# Patient Record
Sex: MALE | Race: WHITE | NOT HISPANIC OR LATINO | Employment: FULL TIME | URBAN - METROPOLITAN AREA
[De-identification: names, ages, dates, MRNs, and addresses within clinical notes are randomized per-mention and may not be internally consistent; named-entity substitution may affect disease eponyms.]

---

## 2018-03-01 ENCOUNTER — APPOINTMENT (OUTPATIENT)
Dept: GENERAL RADIOLOGY | Facility: HOSPITAL | Age: 38
End: 2018-03-01

## 2018-03-01 ENCOUNTER — APPOINTMENT (OUTPATIENT)
Dept: CT IMAGING | Facility: HOSPITAL | Age: 38
End: 2018-03-01

## 2018-03-01 ENCOUNTER — HOSPITAL ENCOUNTER (OUTPATIENT)
Dept: CARDIOLOGY | Facility: HOSPITAL | Age: 38
Discharge: HOME | End: 2018-03-01
Attending: INTERNAL MEDICINE

## 2018-03-01 ENCOUNTER — HOSPITAL ENCOUNTER (OUTPATIENT)
Dept: CARDIOLOGY | Facility: HOSPITAL | Age: 38
Discharge: HOME | End: 2018-03-01
Admitting: FAMILY MEDICINE

## 2018-03-01 ENCOUNTER — HOSPITAL ENCOUNTER (EMERGENCY)
Facility: HOSPITAL | Age: 38
Discharge: HOME | End: 2018-03-01
Attending: FAMILY MEDICINE | Admitting: FAMILY MEDICINE

## 2018-03-01 VITALS
DIASTOLIC BLOOD PRESSURE: 78 MMHG | RESPIRATION RATE: 14 BRPM | HEART RATE: 76 BPM | TEMPERATURE: 98.5 F | OXYGEN SATURATION: 98 % | BODY MASS INDEX: 34.53 KG/M2 | HEIGHT: 67 IN | SYSTOLIC BLOOD PRESSURE: 140 MMHG | WEIGHT: 220 LBS

## 2018-03-01 VITALS — SYSTOLIC BLOOD PRESSURE: 131 MMHG | DIASTOLIC BLOOD PRESSURE: 86 MMHG | OXYGEN SATURATION: 100 % | HEART RATE: 56 BPM

## 2018-03-01 DIAGNOSIS — S20.212A CONTUSION OF LEFT CHEST WALL, INITIAL ENCOUNTER: ICD-10-CM

## 2018-03-01 DIAGNOSIS — R94.31 ABNORMAL ECG: ICD-10-CM

## 2018-03-01 DIAGNOSIS — S43.51XA SPRAIN OF RIGHT ACROMIOCLAVICULAR LIGAMENT, INITIAL ENCOUNTER: ICD-10-CM

## 2018-03-01 DIAGNOSIS — R07.1 CHEST PAIN ON BREATHING: Primary | ICD-10-CM

## 2018-03-01 DIAGNOSIS — R07.1 CHEST PAIN ON BREATHING: ICD-10-CM

## 2018-03-01 DIAGNOSIS — R94.31 ABNORMAL EKG: ICD-10-CM
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
## 2018-03-01 DIAGNOSIS — S01.512A LACERATION OF ORAL CAVITY, INITIAL ENCOUNTER: ICD-10-CM

## 2018-03-01 LAB
ALBUMIN SERPL-MCNC: 4.1 G/DL (ref 3.5–5.2)
ALBUMIN/GLOB SERPL: 1.4 G/DL
ALP SERPL-CCNC: 57 U/L (ref 39–117)
ALT SERPL W P-5'-P-CCNC: 31 U/L (ref 1–41)
ANION GAP SERPL CALCULATED.3IONS-SCNC: 10.6 MMOL/L
AST SERPL-CCNC: 24 U/L (ref 1–40)
BASOPHILS # BLD AUTO: 0.02 10*3/MM3 (ref 0–0.2)
BASOPHILS NFR BLD AUTO: 0.3 % (ref 0–1.5)
BH CV ECHO MEAS - ACS: 2 CM
BH CV ECHO MEAS - AO MAX PG (FULL): 3.7 MMHG
BH CV ECHO MEAS - AO MAX PG: 6.8 MMHG
BH CV ECHO MEAS - AO MEAN PG (FULL): 2.1 MMHG
BH CV ECHO MEAS - AO MEAN PG: 3.4 MMHG
BH CV ECHO MEAS - AO ROOT AREA: 8.4 CM^2
BH CV ECHO MEAS - AO ROOT DIAM: 3.3 CM
BH CV ECHO MEAS - AO V2 MAX: 129.9 CM/SEC
BH CV ECHO MEAS - AO V2 MEAN: 80.7 CM/SEC
BH CV ECHO MEAS - AO V2 VTI: 24.4 CM
BH CV ECHO MEAS - AVA(I,A): 3.4 CM^2
BH CV ECHO MEAS - AVA(I,D): 3.4 CM^2
BH CV ECHO MEAS - AVA(V,A): 3 CM^2
BH CV ECHO MEAS - AVA(V,D): 3 CM^2
BH CV ECHO MEAS - CONTRAST EF (2CH): 61.3 ML/M^2
BH CV ECHO MEAS - CONTRAST EF 4CH: 65.6 ML/M^2
BH CV ECHO MEAS - EDV(MOD-SP2): 80 ML
BH CV ECHO MEAS - EDV(MOD-SP4): 96 ML
BH CV ECHO MEAS - EDV(TEICH): 113.2 ML
BH CV ECHO MEAS - EF(CUBED): 90.8 %
BH CV ECHO MEAS - EF(MOD-SP2): 61.3 %
BH CV ECHO MEAS - EF(MOD-SP4): 65.6 %
BH CV ECHO MEAS - EF(TEICH): 85.4 %
BH CV ECHO MEAS - ESV(MOD-SP2): 31 ML
BH CV ECHO MEAS - ESV(MOD-SP4): 33 ML
BH CV ECHO MEAS - ESV(TEICH): 16.5 ML
BH CV ECHO MEAS - FS: 54.9 %
BH CV ECHO MEAS - IVS/LVPW: 1
BH CV ECHO MEAS - IVSD: 1.2 CM
BH CV ECHO MEAS - LAT PEAK E' VEL: 10 CM/SEC
BH CV ECHO MEAS - LV MASS(C)D: 225.5 GRAMS
BH CV ECHO MEAS - LV MAX PG: 3 MMHG
BH CV ECHO MEAS - LV MEAN PG: 1.3 MMHG
BH CV ECHO MEAS - LV V1 MAX: 86.8 CM/SEC
BH CV ECHO MEAS - LV V1 MEAN: 50.4 CM/SEC
BH CV ECHO MEAS - LV V1 VTI: 18.5 CM
BH CV ECHO MEAS - LVIDD: 4.9 CM
BH CV ECHO MEAS - LVIDS: 2.2 CM
BH CV ECHO MEAS - LVLD AP2: 8.2 CM
BH CV ECHO MEAS - LVLD AP4: 7.6 CM
BH CV ECHO MEAS - LVLS AP2: 7.4 CM
BH CV ECHO MEAS - LVLS AP4: 7.5 CM
BH CV ECHO MEAS - LVOT AREA (M): 4.5 CM^2
BH CV ECHO MEAS - LVOT AREA: 4.5 CM^2
BH CV ECHO MEAS - LVOT DIAM: 2.4 CM
BH CV ECHO MEAS - LVPWD: 1.2 CM
BH CV ECHO MEAS - MED PEAK E' VEL: 8 CM/SEC
BH CV ECHO MEAS - MV A DUR: 0.11 SEC
BH CV ECHO MEAS - MV A MAX VEL: 61.6 CM/SEC
BH CV ECHO MEAS - MV DEC SLOPE: 340 CM/SEC^2
BH CV ECHO MEAS - MV DEC TIME: 0.18 SEC
BH CV ECHO MEAS - MV E MAX VEL: 67.4 CM/SEC
BH CV ECHO MEAS - MV E/A: 1.1
BH CV ECHO MEAS - MV MAX PG: 1.9 MMHG
BH CV ECHO MEAS - MV MEAN PG: 0.68 MMHG
BH CV ECHO MEAS - MV P1/2T MAX VEL: 58.2 CM/SEC
BH CV ECHO MEAS - MV P1/2T: 50.1 MSEC
BH CV ECHO MEAS - MV V2 MAX: 69.8 CM/SEC
BH CV ECHO MEAS - MV V2 MEAN: 35.9 CM/SEC
BH CV ECHO MEAS - MV V2 VTI: 18 CM
BH CV ECHO MEAS - MVA P1/2T LCG: 3.8 CM^2
BH CV ECHO MEAS - MVA(P1/2T): 4.4 CM^2
BH CV ECHO MEAS - MVA(VTI): 4.6 CM^2
BH CV ECHO MEAS - PA MAX PG (FULL): 4 MMHG
BH CV ECHO MEAS - PA MAX PG: 5.3 MMHG
BH CV ECHO MEAS - PA V2 MAX: 115.4 CM/SEC
BH CV ECHO MEAS - PULM A REVS DUR: 0.11 SEC
BH CV ECHO MEAS - PULM A REVS VEL: 31.2 CM/SEC
BH CV ECHO MEAS - PULM DIAS VEL: 37.2 CM/SEC
BH CV ECHO MEAS - PULM S/D: 1.4
BH CV ECHO MEAS - PULM SYS VEL: 51.9 CM/SEC
BH CV ECHO MEAS - PVA(V,A): 1.9 CM^2
BH CV ECHO MEAS - PVA(V,D): 1.9 CM^2
BH CV ECHO MEAS - QP/QS: 0.66
BH CV ECHO MEAS - RV MAX PG: 1.3 MMHG
BH CV ECHO MEAS - RV MEAN PG: 0.71 MMHG
BH CV ECHO MEAS - RV V1 MAX: 56.8 CM/SEC
BH CV ECHO MEAS - RV V1 MEAN: 38.4 CM/SEC
BH CV ECHO MEAS - RV V1 VTI: 14 CM
BH CV ECHO MEAS - RVOT AREA: 3.9 CM^2
BH CV ECHO MEAS - RVOT DIAM: 2.2 CM
BH CV ECHO MEAS - SV(AO): 204.4 ML
BH CV ECHO MEAS - SV(CUBED): 107.4 ML
BH CV ECHO MEAS - SV(LVOT): 82.9 ML
BH CV ECHO MEAS - SV(MOD-SP2): 49 ML
BH CV ECHO MEAS - SV(MOD-SP4): 63 ML
BH CV ECHO MEAS - SV(RVOT): 54.5 ML
BH CV ECHO MEAS - SV(TEICH): 96.8 ML
BH CV ECHO MEAS - TAPSE (>1.6): 2.7 CM2
BH CV XLRA - RV BASE: 2.7 CM
BH CV XLRA - TDI S': 10 CM/SEC
BILIRUB SERPL-MCNC: 0.7 MG/DL (ref 0.1–1.2)
BUN BLD-MCNC: 12 MG/DL (ref 6–20)
BUN/CREAT SERPL: 11.5 (ref 7–25)
CALCIUM SPEC-SCNC: 9 MG/DL (ref 8.6–10.5)
CHLORIDE SERPL-SCNC: 106 MMOL/L (ref 98–107)
CO2 SERPL-SCNC: 26.4 MMOL/L (ref 22–29)
CREAT BLD-MCNC: 1.04 MG/DL (ref 0.76–1.27)
DEPRECATED RDW RBC AUTO: 40.5 FL (ref 37–54)
E/E' RATIO: 7.5
EOSINOPHIL # BLD AUTO: 0.11 10*3/MM3 (ref 0–0.7)
EOSINOPHIL NFR BLD AUTO: 1.5 % (ref 0.3–6.2)
ERYTHROCYTE [DISTWIDTH] IN BLOOD BY AUTOMATED COUNT: 12 % (ref 11.5–14.5)
GFR SERPL CREATININE-BSD FRML MDRD: 80 ML/MIN/1.73
GLOBULIN UR ELPH-MCNC: 2.9 GM/DL
GLUCOSE BLD-MCNC: 114 MG/DL (ref 65–99)
HCT VFR BLD AUTO: 45.9 % (ref 40.4–52.2)
HGB BLD-MCNC: 15.5 G/DL (ref 13.7–17.6)
IMM GRANULOCYTES # BLD: 0 10*3/MM3 (ref 0–0.03)
IMM GRANULOCYTES NFR BLD: 0 % (ref 0–0.5)
LEFT ATRIUM VOLUME INDEX: 16 ML/M2
LV EF 2D ECHO EST: 66 %
LYMPHOCYTES # BLD AUTO: 1.23 10*3/MM3 (ref 0.9–4.8)
LYMPHOCYTES NFR BLD AUTO: 17.3 % (ref 19.6–45.3)
MAXIMAL PREDICTED HEART RATE: 182 BPM
MCH RBC QN AUTO: 31.4 PG (ref 27–32.7)
MCHC RBC AUTO-ENTMCNC: 33.8 G/DL (ref 32.6–36.4)
MCV RBC AUTO: 92.9 FL (ref 79.8–96.2)
MONOCYTES # BLD AUTO: 0.6 10*3/MM3 (ref 0.2–1.2)
MONOCYTES NFR BLD AUTO: 8.4 % (ref 5–12)
NEUTROPHILS # BLD AUTO: 5.15 10*3/MM3 (ref 1.9–8.1)
NEUTROPHILS NFR BLD AUTO: 72.5 % (ref 42.7–76)
PLATELET # BLD AUTO: 250 10*3/MM3 (ref 140–500)
PMV BLD AUTO: 10.8 FL (ref 6–12)
POTASSIUM BLD-SCNC: 4.5 MMOL/L (ref 3.5–5.2)
PROT SERPL-MCNC: 7 G/DL (ref 6–8.5)
RBC # BLD AUTO: 4.94 10*6/MM3 (ref 4.6–6)
SODIUM BLD-SCNC: 143 MMOL/L (ref 136–145)
STRESS TARGET HR: 155 BPM
TROPONIN T SERPL-MCNC: <0.01 NG/ML (ref 0–0.03)
WBC NRBC COR # BLD: 7.11 10*3/MM3 (ref 4.5–10.7)

## 2018-03-01 PROCEDURE — 96374 THER/PROPH/DIAG INJ IV PUSH: CPT

## 2018-03-01 PROCEDURE — 73000 X-RAY EXAM OF COLLAR BONE: CPT

## 2018-03-01 PROCEDURE — 85025 COMPLETE CBC W/AUTO DIFF WBC: CPT | Performed by: FAMILY MEDICINE

## 2018-03-01 PROCEDURE — 93306 TTE W/DOPPLER COMPLETE: CPT

## 2018-03-01 PROCEDURE — 96376 TX/PRO/DX INJ SAME DRUG ADON: CPT

## 2018-03-01 PROCEDURE — 96375 TX/PRO/DX INJ NEW DRUG ADDON: CPT

## 2018-03-01 PROCEDURE — 73610 X-RAY EXAM OF ANKLE: CPT

## 2018-03-01 PROCEDURE — 93010 ELECTROCARDIOGRAM REPORT: CPT | Performed by: INTERNAL MEDICINE

## 2018-03-01 PROCEDURE — 94760 N-INVAS EAR/PLS OXIMETRY 1: CPT

## 2018-03-01 PROCEDURE — 25010000002 HYDROMORPHONE PER 4 MG: Performed by: FAMILY MEDICINE

## 2018-03-01 PROCEDURE — 0 IOPAMIDOL PER 1 ML: Performed by: FAMILY MEDICINE

## 2018-03-01 PROCEDURE — 93306 TTE W/DOPPLER COMPLETE: CPT | Performed by: INTERNAL MEDICINE

## 2018-03-01 PROCEDURE — 25010000002 ONDANSETRON PER 1 MG: Performed by: FAMILY MEDICINE

## 2018-03-01 PROCEDURE — 99244 OFF/OP CNSLTJ NEW/EST MOD 40: CPT | Performed by: INTERNAL MEDICINE

## 2018-03-01 PROCEDURE — 71275 CT ANGIOGRAPHY CHEST: CPT

## 2018-03-01 PROCEDURE — 99284 EMERGENCY DEPT VISIT MOD MDM: CPT

## 2018-03-01 PROCEDURE — 93005 ELECTROCARDIOGRAM TRACING: CPT | Performed by: FAMILY MEDICINE

## 2018-03-01 PROCEDURE — 80053 COMPREHEN METABOLIC PANEL: CPT | Performed by: FAMILY MEDICINE

## 2018-03-01 PROCEDURE — 84484 ASSAY OF TROPONIN QUANT: CPT | Performed by: FAMILY MEDICINE

## 2018-03-01 PROCEDURE — 25010000002 PERFLUTREN (DEFINITY) 8.476 MG IN SODIUM CHLORIDE 0.9 % 10 ML INJECTION: Performed by: INTERNAL MEDICINE

## 2018-03-01 PROCEDURE — 70486 CT MAXILLOFACIAL W/O DYE: CPT

## 2018-03-01 RX ORDER — HYDROCODONE BITARTRATE AND ACETAMINOPHEN 5; 325 MG/1; MG/1
1 TABLET ORAL EVERY 4 HOURS PRN
Qty: 18 TABLET | Refills: 0 | OUTPATIENT
Start: 2018-03-01 | End: 2019-05-16

## 2018-03-01 RX ORDER — ONDANSETRON 4 MG/1
TABLET, FILM COATED ORAL
Qty: 30 TABLET | Refills: 0 | OUTPATIENT
Start: 2018-03-01 | End: 2019-05-16

## 2018-03-01 RX ORDER — HYDROMORPHONE HCL 110MG/55ML
0.5 PATIENT CONTROLLED ANALGESIA SYRINGE INTRAVENOUS ONCE
Status: COMPLETED | OUTPATIENT
Start: 2018-03-01 | End: 2018-03-01

## 2018-03-01 RX ORDER — ONDANSETRON 2 MG/ML
4 INJECTION INTRAMUSCULAR; INTRAVENOUS ONCE
Status: COMPLETED | OUTPATIENT
Start: 2018-03-01 | End: 2018-03-01

## 2018-03-01 RX ADMIN — HYDROMORPHONE HYDROCHLORIDE 0.5 MG: 2 INJECTION INTRAMUSCULAR; INTRAVENOUS; SUBCUTANEOUS at 08:56

## 2018-03-01 RX ADMIN — HYDROMORPHONE HYDROCHLORIDE 0.5 MG: 2 INJECTION INTRAMUSCULAR; INTRAVENOUS; SUBCUTANEOUS at 12:53

## 2018-03-01 RX ADMIN — ONDANSETRON 4 MG: 2 INJECTION INTRAMUSCULAR; INTRAVENOUS at 08:52

## 2018-03-01 RX ADMIN — IOPAMIDOL 95 ML: 755 INJECTION, SOLUTION INTRAVENOUS at 09:28

## 2018-03-01 RX ADMIN — PERFLUTREN 1.5 ML: 6.52 INJECTION, SUSPENSION INTRAVENOUS at 17:00
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
## 2018-03-01 NOTE — ADDENDUM NOTE
Encounter addended by: My Nesbitt RN on: 3/1/2018  4:55 PM<BR>     Actions taken: Clinical References accepted, Sign clinical note

## 2018-03-01 NOTE — ADDENDUM NOTE
Encounter addended by: Priya Juarez MD on: 3/1/2018  4:34 PM<BR>     Actions taken: Follow-up modified, Sign clinical note

## 2018-03-01 NOTE — ADDENDUM NOTE
Encounter addended by: Barbara Marley RN on: 3/1/2018  4:30 PM<BR>     Actions taken: Charge Capture section accepted
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
## 2018-03-01 NOTE — ADDENDUM NOTE
Encounter addended by: Moustapha Young MA on: 3/1/2018  4:24 PM<BR>     Actions taken: Visit Navigator SmartForm Flowsheet section accepted
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
## 2018-03-01 NOTE — PATIENT INSTRUCTIONS
Exercise Stress Echocardiogram, Care After  This sheet gives you information about how to care for yourself after your procedure. Your doctor may also give you more specific instructions. If you have problems or questions, call your doctor.  Follow these instructions at home:  · You may do these things as told by your doctor:  ¨ Eat what you normally eat.  ¨ Do your normal activities.  ¨ Take your normal medicines.  · Take over-the-counter and prescription medicines only as told by your doctor.  · Keep all follow-up visits as told by your doctor. This is important.  Contact a doctor if:  · You keep feeling dizzy or light-headed.  · You feel like your heart is beating fast.  · You keep feeling sick to your stomach (nauseous) or you throw up (vomit).  · You have a headache.  · You feel short of breath.  Get help right away if:  · You have pain or pressure in any of these areas:  ¨ Your chest.  ¨ Your jaw or neck.  ¨ Between your shoulder blades.  ¨ Down your left arm.  · You pass out (faint).  · You have trouble breathing.  Summary  · After your procedure, you may eat like normal, do your normal activities, and take your normal medicines as told by your doctor.  · Contact your doctor if you have dizziness, a fast heartbeat, or a headache.  · You should also contact your doctor if you feel sick to your stomach (nauseous), you throw up (vomit), or you feel short of breath.  · Get help right away if you feel pain or pressure in any of these areas: your chest, jaw, neck, between your shoulder blades, or down your right arm.  · You should also get help right away if you pass out (faint) or have trouble breathing.  This information is not intended to replace advice given to you by your health care provider. Make sure you discuss any questions you have with your health care provider.  Document Released: 10/08/2014 Document Revised: 09/11/2017 Document Reviewed: 09/11/2017  [employer] Patient Education © 2017  [employer]

## 2018-03-01 NOTE — ED PROVIDER NOTES
Laceration Repair  Date/Time: 3/1/2018 11:50 AM  Performed by: NICOLE SORTO  Authorized by: KENNY RIOS     Consent:     Consent obtained:  Verbal    Consent given by:  Patient    Risks discussed:  Pain  Anesthesia (see MAR for exact dosages):     Anesthesia method:  None  Laceration details:     Location:  Mouth    Mouth location:  L buccal mucosa    Length (cm):  0.8  Repair type:     Repair type:  Simple  Pre-procedure details:     Preparation:  Patient was prepped and draped in usual sterile fashion  Exploration:     Hemostasis achieved with:  Direct pressure    Contaminated: no    Treatment:     Wound cleansed with: peroxide, sterile water.    Amount of cleaning:  Standard    Irrigation solution:  Sterile water    Irrigation method:  Syringe  Mucous membrane repair:     Suture size:  4-0    Suture material:  Vicryl    Suture technique:  Simple interrupted    Number of sutures:  1  Post-procedure details:     Dressing:  Open (no dressing)    Patient tolerance of procedure:  Tolerated well, no immediate complications      Documentation assistance provided by scribe Liyah Miranda for Nicole Sorto PA-C. Information recorded by the scribe was done at my direction and has been verified and validated by me.      Liyah Miranda  03/01/18 1209       ASAD Salmeron  03/06/18 0109

## 2018-03-08 ENCOUNTER — TELEPHONE (OUTPATIENT)
Dept: CARDIOLOGY | Facility: HOSPITAL | Age: 38
End: 2018-03-08

## 2019-05-16 ENCOUNTER — HOSPITAL ENCOUNTER (EMERGENCY)
Facility: HOSPITAL | Age: 39
Discharge: HOME | End: 2019-05-16
Attending: EMERGENCY MEDICINE | Admitting: EMERGENCY MEDICINE

## 2019-05-16 VITALS
DIASTOLIC BLOOD PRESSURE: 86 MMHG | OXYGEN SATURATION: 98 % | SYSTOLIC BLOOD PRESSURE: 141 MMHG | BODY MASS INDEX: 33.34 KG/M2 | RESPIRATION RATE: 18 BRPM | HEART RATE: 49 BPM | TEMPERATURE: 97.8 F | HEIGHT: 68 IN | WEIGHT: 220 LBS

## 2019-05-16 DIAGNOSIS — R20.2 PARESTHESIAS IN LEFT HAND: Primary | ICD-10-CM

## 2019-05-16 PROCEDURE — 99283 EMERGENCY DEPT VISIT LOW MDM: CPT

## 2020-09-15 ENCOUNTER — OFFICE VISIT (OUTPATIENT)
Dept: FAMILY MEDICINE CLINIC | Facility: CLINIC | Age: 40
End: 2020-09-15

## 2020-09-15 VITALS
DIASTOLIC BLOOD PRESSURE: 88 MMHG | SYSTOLIC BLOOD PRESSURE: 118 MMHG | HEART RATE: 64 BPM | TEMPERATURE: 97.8 F | OXYGEN SATURATION: 99 % | HEIGHT: 68 IN | BODY MASS INDEX: 37.74 KG/M2 | WEIGHT: 249 LBS

## 2020-09-15 DIAGNOSIS — Z00.00 PHYSICAL EXAM: ICD-10-CM

## 2020-09-15 DIAGNOSIS — Z13.6 SCREENING FOR CARDIOVASCULAR CONDITION: Primary | ICD-10-CM

## 2020-09-15 DIAGNOSIS — K92.1 BLOOD IN STOOL: ICD-10-CM

## 2020-09-15 PROBLEM — E66.01 MORBID (SEVERE) OBESITY DUE TO EXCESS CALORIES (HCC): Status: ACTIVE | Noted: 2020-09-15

## 2020-09-15 PROCEDURE — 99386 PREV VISIT NEW AGE 40-64: CPT | Performed by: FAMILY MEDICINE
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
## 2020-09-16 DIAGNOSIS — R73.9 HYPERGLYCEMIA: Primary | ICD-10-CM

## 2020-09-16 LAB
ALBUMIN SERPL-MCNC: 4.6 G/DL (ref 3.5–5.2)
ALBUMIN/GLOB SERPL: 2.3 G/DL
ALP SERPL-CCNC: 71 U/L (ref 39–117)
ALT SERPL-CCNC: 31 U/L (ref 1–41)
AST SERPL-CCNC: 23 U/L (ref 1–40)
BASOPHILS # BLD AUTO: 0.06 10*3/MM3 (ref 0–0.2)
BASOPHILS NFR BLD AUTO: 1.2 % (ref 0–1.5)
BILIRUB SERPL-MCNC: 0.7 MG/DL (ref 0–1.2)
BUN SERPL-MCNC: 13 MG/DL (ref 6–20)
BUN/CREAT SERPL: 12.6 (ref 7–25)
CALCIUM SERPL-MCNC: 9.5 MG/DL (ref 8.6–10.5)
CHLORIDE SERPL-SCNC: 106 MMOL/L (ref 98–107)
CHOLEST SERPL-MCNC: 158 MG/DL (ref 0–200)
CO2 SERPL-SCNC: 25.3 MMOL/L (ref 22–29)
CREAT SERPL-MCNC: 1.03 MG/DL (ref 0.76–1.27)
EOSINOPHIL # BLD AUTO: 0.19 10*3/MM3 (ref 0–0.4)
EOSINOPHIL NFR BLD AUTO: 3.8 % (ref 0.3–6.2)
ERYTHROCYTE [DISTWIDTH] IN BLOOD BY AUTOMATED COUNT: 11.9 % (ref 12.3–15.4)
GLOBULIN SER CALC-MCNC: 2 GM/DL
GLUCOSE SERPL-MCNC: 110 MG/DL (ref 65–99)
HCT VFR BLD AUTO: 45.1 % (ref 37.5–51)
HDLC SERPL-MCNC: 53 MG/DL (ref 40–60)
HGB BLD-MCNC: 15.5 G/DL (ref 13–17.7)
IMM GRANULOCYTES # BLD AUTO: 0.01 10*3/MM3 (ref 0–0.05)
IMM GRANULOCYTES NFR BLD AUTO: 0.2 % (ref 0–0.5)
LDLC SERPL CALC-MCNC: 92 MG/DL (ref 0–100)
LYMPHOCYTES # BLD AUTO: 1.31 10*3/MM3 (ref 0.7–3.1)
LYMPHOCYTES NFR BLD AUTO: 25.9 % (ref 19.6–45.3)
MCH RBC QN AUTO: 32.4 PG (ref 26.6–33)
MCHC RBC AUTO-ENTMCNC: 34.4 G/DL (ref 31.5–35.7)
MCV RBC AUTO: 94.2 FL (ref 79–97)
MONOCYTES # BLD AUTO: 0.51 10*3/MM3 (ref 0.1–0.9)
MONOCYTES NFR BLD AUTO: 10.1 % (ref 5–12)
NEUTROPHILS # BLD AUTO: 2.97 10*3/MM3 (ref 1.7–7)
NEUTROPHILS NFR BLD AUTO: 58.8 % (ref 42.7–76)
NRBC BLD AUTO-RTO: 0 /100 WBC (ref 0–0.2)
PLATELET # BLD AUTO: 286 10*3/MM3 (ref 140–450)
POTASSIUM SERPL-SCNC: 5.1 MMOL/L (ref 3.5–5.2)
PROT SERPL-MCNC: 6.6 G/DL (ref 6–8.5)
RBC # BLD AUTO: 4.79 10*6/MM3 (ref 4.14–5.8)
SODIUM SERPL-SCNC: 142 MMOL/L (ref 136–145)
TRIGL SERPL-MCNC: 65 MG/DL (ref 0–150)
TSH SERPL DL<=0.005 MIU/L-ACNC: 1.72 UIU/ML (ref 0.27–4.2)
VLDLC SERPL CALC-MCNC: 13 MG/DL
WBC # BLD AUTO: 5.05 10*3/MM3 (ref 3.4–10.8)

## 2020-11-03 ENCOUNTER — OFFICE VISIT (OUTPATIENT)
Dept: GASTROENTEROLOGY | Facility: CLINIC | Age: 40
End: 2020-11-03

## 2020-11-03 ENCOUNTER — PREP FOR SURGERY (OUTPATIENT)
Dept: OTHER | Facility: HOSPITAL | Age: 40
End: 2020-11-03

## 2020-11-03 VITALS
HEIGHT: 68 IN | TEMPERATURE: 97 F | WEIGHT: 242.2 LBS | OXYGEN SATURATION: 99 % | HEART RATE: 66 BPM | BODY MASS INDEX: 36.71 KG/M2 | SYSTOLIC BLOOD PRESSURE: 128 MMHG | DIASTOLIC BLOOD PRESSURE: 82 MMHG

## 2020-11-03 DIAGNOSIS — K92.1 BLOOD IN STOOL: Primary | ICD-10-CM

## 2020-11-03 PROCEDURE — 99204 OFFICE O/P NEW MOD 45 MIN: CPT | Performed by: INTERNAL MEDICINE
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
## 2020-11-09 ENCOUNTER — LAB REQUISITION (OUTPATIENT)
Dept: LAB | Facility: HOSPITAL | Age: 40
End: 2020-11-09

## 2020-11-09 DIAGNOSIS — Z00.00 ENCOUNTER FOR GENERAL ADULT MEDICAL EXAMINATION WITHOUT ABNORMAL FINDINGS: ICD-10-CM

## 2020-11-09 PROCEDURE — U0004 COV-19 TEST NON-CDC HGH THRU: HCPCS | Performed by: INTERNAL MEDICINE

## 2020-11-10 LAB — SARS-COV-2 RNA RESP QL NAA+PROBE: NOT DETECTED

## 2020-11-13 ENCOUNTER — OUTSIDE FACILITY SERVICE (OUTPATIENT)
Dept: GASTROENTEROLOGY | Facility: CLINIC | Age: 40
End: 2020-11-13

## 2020-11-13 PROCEDURE — 45378 DIAGNOSTIC COLONOSCOPY: CPT | Performed by: INTERNAL MEDICINE

## 2020-11-13 RX ORDER — HYDROCORTISONE ACETATE PRAMOXINE HCL 2.5; 1 G/100G; G/100G
CREAM TOPICAL
Qty: 30 G | Refills: 1 | Status: SHIPPED | OUTPATIENT
Start: 2020-11-13

## 2021-04-02 ENCOUNTER — BULK ORDERING (OUTPATIENT)
Dept: CASE MANAGEMENT | Facility: OTHER | Age: 41
End: 2021-04-02

## 2021-04-02 DIAGNOSIS — Z23 IMMUNIZATION DUE: ICD-10-CM

## 2021-04-26 ENCOUNTER — IMMUNIZATION (OUTPATIENT)
Dept: VACCINE CLINIC | Facility: HOSPITAL | Age: 41
End: 2021-04-26

## 2021-04-26 DIAGNOSIS — Z23 IMMUNIZATION DUE: ICD-10-CM

## 2021-04-26 PROCEDURE — 0001A: CPT | Performed by: INTERNAL MEDICINE

## 2021-04-26 PROCEDURE — 91300 HC SARSCOV02 VAC 30MCG/0.3ML IM: CPT | Performed by: INTERNAL MEDICINE

## 2021-05-17 ENCOUNTER — IMMUNIZATION (OUTPATIENT)
Dept: VACCINE CLINIC | Facility: HOSPITAL | Age: 41
End: 2021-05-17

## 2021-05-17 PROCEDURE — 91300 HC SARSCOV02 VAC 30MCG/0.3ML IM: CPT | Performed by: INTERNAL MEDICINE

## 2021-05-17 PROCEDURE — 0002A: CPT | Performed by: INTERNAL MEDICINE

## 2023-05-18 ENCOUNTER — HOSPITAL ENCOUNTER (OUTPATIENT)
Dept: CARDIOLOGY | Facility: HOSPITAL | Age: 43
Discharge: HOME | End: 2023-05-18
Admitting: INTERNAL MEDICINE
Payer: COMMERCIAL

## 2023-05-18 ENCOUNTER — HOSPITAL ENCOUNTER (OUTPATIENT)
Dept: CARDIOLOGY | Facility: HOSPITAL | Age: 43
Discharge: HOME | End: 2023-05-18
Payer: COMMERCIAL

## 2023-05-18 ENCOUNTER — OFFICE VISIT (OUTPATIENT)
Dept: CARDIOLOGY | Facility: CLINIC | Age: 43
End: 2023-05-18
Payer: COMMERCIAL

## 2023-05-18 VITALS
WEIGHT: 265.6 LBS | HEIGHT: 68 IN | BODY MASS INDEX: 40.25 KG/M2 | HEART RATE: 61 BPM | SYSTOLIC BLOOD PRESSURE: 140 MMHG | DIASTOLIC BLOOD PRESSURE: 82 MMHG

## 2023-05-18 VITALS — HEIGHT: 68 IN | WEIGHT: 265 LBS | BODY MASS INDEX: 40.16 KG/M2

## 2023-05-18 DIAGNOSIS — R07.2 PRECORDIAL PAIN: Primary | ICD-10-CM

## 2023-05-18 DIAGNOSIS — R07.2 PRECORDIAL PAIN: ICD-10-CM

## 2023-05-18 DIAGNOSIS — R94.31 ABNORMAL EKG: ICD-10-CM

## 2023-05-18 LAB
ANION GAP SERPL CALCULATED.3IONS-SCNC: 9 MMOL/L (ref 5–15)
BASOPHILS # BLD AUTO: 0.04 10*3/MM3 (ref 0–0.2)
BASOPHILS NFR BLD AUTO: 0.7 % (ref 0–1.5)
BH CV NUCLEAR PRIOR STUDY: 2
BH CV REST NUCLEAR ISOTOPE DOSE: 12 MCI
BH CV STRESS BP STAGE 1: NORMAL
BH CV STRESS BP STAGE 2: NORMAL
BH CV STRESS BP STAGE 3: NORMAL
BH CV STRESS DURATION MIN STAGE 1: 3
BH CV STRESS DURATION MIN STAGE 2: 3
BH CV STRESS DURATION MIN STAGE 3: 3
BH CV STRESS DURATION SEC STAGE 1: 0
BH CV STRESS DURATION SEC STAGE 2: 0
BH CV STRESS DURATION SEC STAGE 3: 1
BH CV STRESS GRADE STAGE 1: 10
BH CV STRESS GRADE STAGE 2: 12
BH CV STRESS GRADE STAGE 3: 14
BH CV STRESS HR STAGE 1: 117
BH CV STRESS HR STAGE 2: 133
BH CV STRESS HR STAGE 3: 156
BH CV STRESS METS STAGE 1: 5
BH CV STRESS METS STAGE 2: 7.5
BH CV STRESS METS STAGE 3: 10
BH CV STRESS NUCLEAR ISOTOPE DOSE: 35.7 MCI
BH CV STRESS PROTOCOL 1: NORMAL
BH CV STRESS RECOVERY BP: NORMAL MMHG
BH CV STRESS RECOVERY HR: 98 BPM
BH CV STRESS SPEED STAGE 1: 1.7
BH CV STRESS SPEED STAGE 2: 2.5
BH CV STRESS SPEED STAGE 3: 3.4
BH CV STRESS STAGE 1: 1
BH CV STRESS STAGE 2: 2
BH CV STRESS STAGE 3: 3
BUN SERPL-MCNC: 14 MG/DL (ref 6–20)
BUN/CREAT SERPL: 14 (ref 7–25)
CALCIUM SPEC-SCNC: 9 MG/DL (ref 8.6–10.5)
CHLORIDE SERPL-SCNC: 106 MMOL/L (ref 98–107)
CO2 SERPL-SCNC: 25 MMOL/L (ref 22–29)
CREAT SERPL-MCNC: 1 MG/DL (ref 0.76–1.27)
D DIMER PPP FEU-MCNC: 0.57 MCGFEU/ML (ref 0–0.5)
DEPRECATED RDW RBC AUTO: 41 FL (ref 37–54)
EGFRCR SERPLBLD CKD-EPI 2021: 95.8 ML/MIN/1.73
EOSINOPHIL # BLD AUTO: 0.15 10*3/MM3 (ref 0–0.4)
EOSINOPHIL NFR BLD AUTO: 2.5 % (ref 0.3–6.2)
ERYTHROCYTE [DISTWIDTH] IN BLOOD BY AUTOMATED COUNT: 12.5 % (ref 12.3–15.4)
GLUCOSE SERPL-MCNC: 86 MG/DL (ref 65–99)
HCT VFR BLD AUTO: 41.7 % (ref 37.5–51)
HGB BLD-MCNC: 14.6 G/DL (ref 13–17.7)
IMM GRANULOCYTES # BLD AUTO: 0.02 10*3/MM3 (ref 0–0.05)
IMM GRANULOCYTES NFR BLD AUTO: 0.3 % (ref 0–0.5)
LV EF NUC BP: 63 %
LYMPHOCYTES # BLD AUTO: 1.84 10*3/MM3 (ref 0.7–3.1)
LYMPHOCYTES NFR BLD AUTO: 30.6 % (ref 19.6–45.3)
MAXIMAL PREDICTED HEART RATE: 177 BPM
MCH RBC QN AUTO: 31.5 PG (ref 26.6–33)
MCHC RBC AUTO-ENTMCNC: 35 G/DL (ref 31.5–35.7)
MCV RBC AUTO: 90.1 FL (ref 79–97)
MONOCYTES # BLD AUTO: 0.68 10*3/MM3 (ref 0.1–0.9)
MONOCYTES NFR BLD AUTO: 11.3 % (ref 5–12)
NEUTROPHILS NFR BLD AUTO: 3.29 10*3/MM3 (ref 1.7–7)
NEUTROPHILS NFR BLD AUTO: 54.6 % (ref 42.7–76)
NRBC BLD AUTO-RTO: 0 /100 WBC (ref 0–0.2)
PERCENT MAX PREDICTED HR: 88.14 %
PLATELET # BLD AUTO: 267 10*3/MM3 (ref 140–450)
PMV BLD AUTO: 10.5 FL (ref 6–12)
POTASSIUM SERPL-SCNC: 4.1 MMOL/L (ref 3.5–5.2)
RBC # BLD AUTO: 4.63 10*6/MM3 (ref 4.14–5.8)
SODIUM SERPL-SCNC: 140 MMOL/L (ref 136–145)
STRESS BASELINE BP: NORMAL MMHG
STRESS BASELINE HR: 72 BPM
STRESS PERCENT HR: 104 %
STRESS POST ESTIMATED WORKLOAD: 10.2 METS
STRESS POST EXERCISE DUR MIN: 9 MIN
STRESS POST EXERCISE DUR SEC: 1 SEC
STRESS POST PEAK BP: NORMAL MMHG
STRESS POST PEAK HR: 156 BPM
STRESS TARGET HR: 150 BPM
WBC NRBC COR # BLD: 6.02 10*3/MM3 (ref 3.4–10.8)

## 2023-05-18 PROCEDURE — 78452 HT MUSCLE IMAGE SPECT MULT: CPT

## 2023-05-18 PROCEDURE — 80048 BASIC METABOLIC PNL TOTAL CA: CPT | Performed by: INTERNAL MEDICINE

## 2023-05-18 PROCEDURE — 99204 OFFICE O/P NEW MOD 45 MIN: CPT | Performed by: INTERNAL MEDICINE

## 2023-05-18 PROCEDURE — 0 TECHNETIUM TETROFOSMIN KIT: Performed by: INTERNAL MEDICINE

## 2023-05-18 PROCEDURE — 93000 ELECTROCARDIOGRAM COMPLETE: CPT | Performed by: INTERNAL MEDICINE

## 2023-05-18 PROCEDURE — 85025 COMPLETE CBC W/AUTO DIFF WBC: CPT | Performed by: INTERNAL MEDICINE

## 2023-05-18 PROCEDURE — 85379 FIBRIN DEGRADATION QUANT: CPT | Performed by: INTERNAL MEDICINE

## 2023-05-18 PROCEDURE — 93017 CV STRESS TEST TRACING ONLY: CPT

## 2023-05-18 PROCEDURE — 36415 COLL VENOUS BLD VENIPUNCTURE: CPT

## 2023-05-18 PROCEDURE — A9502 TC99M TETROFOSMIN: HCPCS | Performed by: INTERNAL MEDICINE

## 2023-05-18 RX ADMIN — TETROFOSMIN 1 DOSE: 1.38 INJECTION, POWDER, LYOPHILIZED, FOR SOLUTION INTRAVENOUS at 13:20

## 2023-05-18 RX ADMIN — TETROFOSMIN 1 DOSE: 1.38 INJECTION, POWDER, LYOPHILIZED, FOR SOLUTION INTRAVENOUS at 14:28

## 2023-05-18 NOTE — PROGRESS NOTES
[facility]      Patient Name: Andre Eckert  :1980  Age: 42 y.o.  Encounter Provider:  Isaac Hill Jr, MD      Chief Complaint: Initial evaluation precordial pain      HPI  Andre Eckert is a 42 y.o. male with known history of abnormal EKG and normal echocardiogram in 2018 presents for initial evaluation of chest pain.  Patient has been having a sharp precordial pain over the last week and a half.  He notes radiation to the back, neck and distal left arm at times.  No relationship to physical activity and he actually endorses improvement in pain when he gets on the treadmill.  No associated nausea, vomiting, diaphoresis.  No orthopnea, PND or edema.  No palpitations, dizziness or syncope.  He is a lifelong non-smoker who drinks 3-4 alcoholic beverages daily.  No family history of obstructive coronary artery disease.  Recent laboratories reviewed showing excellent cholesterol control.  Blood pressure is borderline elevated today in clinic.      The following portions of the patient's history were reviewed and updated as appropriate: allergies, current medications, past family history, past medical history, past social history, past surgical history and problem list.      Review of Systems   Constitutional: Negative for chills and fever.   HENT: Negative for hoarse voice and sore throat.    Eyes: Negative for double vision and photophobia.   Cardiovascular: Positive for chest pain. Negative for leg swelling, near-syncope, orthopnea, palpitations, paroxysmal nocturnal dyspnea and syncope.   Respiratory: Negative for cough and wheezing.    Skin: Negative for poor wound healing and rash.   Musculoskeletal: Negative for arthritis and joint swelling.   Gastrointestinal: Negative for bloating, abdominal pain, hematemesis and hematochezia.   Neurological: Negative for dizziness and focal weakness.   Psychiatric/Behavioral: Negative for depression and suicidal ideas.       OBJECTIVE:   Vital  "Signs  Vitals:    05/18/23 1210   BP: 140/82   Pulse: 61     Estimated body mass index is 40.38 kg/m² as calculated from the following:    Height as of this encounter: 172.7 cm (68\").    Weight as of this encounter: 120 kg (265 lb 9.6 oz).    Vitals reviewed.   Constitutional:       Appearance: Healthy appearance. Not in distress.   Neck:      Vascular: No JVR. JVD normal.   Pulmonary:      Effort: Pulmonary effort is normal.      Breath sounds: Normal breath sounds. No wheezing. No rhonchi. No rales.   Chest:      Chest wall: Not tender to palpatation.   Cardiovascular:      PMI at left midclavicular line. Normal rate. Regular rhythm. Normal S1. Normal S2.      Murmurs: There is no murmur.      No gallop. No click. No rub.   Pulses:     Intact distal pulses.   Edema:     Peripheral edema absent.   Abdominal:      General: Bowel sounds are normal.      Palpations: Abdomen is soft.      Tenderness: There is no abdominal tenderness.   Musculoskeletal: Normal range of motion.         General: No tenderness. Skin:     General: Skin is warm and dry.   Neurological:      General: No focal deficit present.      Mental Status: Alert and oriented to person, place and time.           ECG 12 Lead    Date/Time: 5/18/2023 1:05 PM  Performed by: Isaac Hill Jr., MD  Authorized by: Isaac Hill Jr., MD   Comparison: compared with previous ECG from 3/1/2018  Similar to previous ECG  Rhythm: sinus rhythm  Conduction: non-specific intraventricular conduction delay  T inversion: V3, V4, V5 and V6    Clinical impression: abnormal EKG                 BUN   Date Value Ref Range Status   09/15/2020 13 6 - 20 mg/dL Final   03/01/2018 12 6 - 20 mg/dL Final     Creatinine   Date Value Ref Range Status   09/15/2020 1.03 0.76 - 1.27 mg/dL Final   03/01/2018 1.04 0.76 - 1.27 mg/dL Final     Potassium   Date Value Ref Range Status   09/15/2020 5.1 3.5 - 5.2 mmol/L Final   03/01/2018 4.5 3.5 - 5.2 mmol/L Final     ALT (SGPT)   Date Value Ref " Range Status   09/15/2020 31 1 - 41 U/L Final   03/01/2018 31 1 - 41 U/L Final     AST (SGOT)   Date Value Ref Range Status   09/15/2020 23 1 - 40 U/L Final   03/01/2018 24 1 - 40 U/L Final           ASSESSMENT:      Diagnosis Plan   1. Precordial pain  Stress Test With Myocardial Perfusion One Day    CBC & Differential    D-dimer, Quantitative    Basic Metabolic Panel      2. Abnormal EKG  Stress Test With Myocardial Perfusion One Day            PLAN OF CARE:     1. Abnormal EKG -minimally changed from previous.  Previous echocardiogram with normal EF and no significant valvular heart disease with mild concentric hypertrophy.  This EKG borders on meeting diagnostic criteria for left ventricular hypertrophy with strain.    2. Precordial pain -typical and atypical characteristics in gentleman with abnormal EKG.  We will check some basic labs including D-dimer.  If negative we will plan to move forward with a nuclear stress study.    Return to clinic 6-month    Addendum: Nuclear stress study shows no evidence of myocardial ischemia.  Normal EF.  Given the EKG and echo picture he will check a twice daily blood pressure log and send it in after 1 week.  We discussed further screening with coronary artery calcium score and he is willing to move forward for better risk stratification.  His D-dimer was minimally elevated and the scenario is not consistent with PE.  Follow diagnostic testing results for further treatment recommendations.         Discharge Medications          Accurate as of May 18, 2023  1:01 PM. If you have any questions, ask your nurse or doctor.            Continue These Medications      Instructions Start Date   Hydrocort-Pramoxine (Perianal) 2.5-1 % rectal cream  Commonly known as: Analpram HC   Apply to hemorrhoids twice daily for 10 days      multivitamin with minerals tablet tablet   1 tablet, Oral, Daily             Thank you for allowing me to participate in the care of your  patient,      Sincerely,   Isaac Hill Jr, MD  [facility]  05/18/23  13:01 EDT

## 2025-03-12 ENCOUNTER — OFFICE VISIT (OUTPATIENT)
Dept: CARDIOLOGY | Facility: CLINIC | Age: 45
End: 2025-03-12
Payer: COMMERCIAL

## 2025-03-12 VITALS
DIASTOLIC BLOOD PRESSURE: 96 MMHG | BODY MASS INDEX: 40.52 KG/M2 | SYSTOLIC BLOOD PRESSURE: 126 MMHG | HEIGHT: 70 IN | HEART RATE: 66 BPM | WEIGHT: 283 LBS

## 2025-03-12 DIAGNOSIS — G47.10 HYPERSOMNIA: ICD-10-CM

## 2025-03-12 DIAGNOSIS — R07.2 PRECORDIAL PAIN: ICD-10-CM

## 2025-03-12 DIAGNOSIS — I51.7 LEFT VENTRICULAR HYPERTROPHY: ICD-10-CM

## 2025-03-12 DIAGNOSIS — I10 ESSENTIAL HYPERTENSION: Primary | ICD-10-CM

## 2025-03-12 RX ORDER — AMLODIPINE BESYLATE 5 MG/1
5 TABLET ORAL DAILY
Qty: 30 TABLET | Refills: 11 | Status: SHIPPED | OUTPATIENT
Start: 2025-03-12

## 2025-03-12 NOTE — PROGRESS NOTES
[facility]      Patient Name: Andre Eckert  :1980  Age: 44 y.o.  Encounter Provider:  Isaac Hill Jr, MD      Chief Complaint: Initial evaluation precordial pain      Hypertension  Associated symptoms include chest pain. Pertinent negatives include no orthopnea, palpitations or PND.       Andre Eckert is a 44 y.o. male with known history of abnormal EKG and normal echocardiogram in 2018 presents for follow-up evaluation of chest pain.     Last clinic visit note: Patient has been having a sharp precordial pain over the last week and a half.  He notes radiation to the back, neck and distal left arm at times.  No relationship to physical activity and he actually endorses improvement in pain when he gets on the treadmill.  No associated nausea, vomiting, diaphoresis.  No orthopnea, PND or edema.  No palpitations, dizziness or syncope.  He is a lifelong non-smoker who drinks 3-4 alcoholic beverages daily.  No family history of obstructive coronary artery disease.  Recent laboratories reviewed showing excellent cholesterol control.  Blood pressure is borderline elevated today in clinic.    After last visit the patient had a stress study that showed no evidence of ischemia.  He had no evidence of hypertension while in the clinic and was doing well until recently.  Patient has been having some episodes of chest pain at which time that his fiancée checked his blood pressure which was quite elevated.  On  was 159/102 mmHg.  She gave him one of her amlodipine tablets and this came down.  He has had a few episodes of chest pain over the past few weeks.  No clear relationship to physical activity.  Today in clinic blood pressure is 126/96 mmHg with a resting heart rate of 66 bpm.  Patient denies orthopnea, PND or edema.  No palpitations, dizziness syncope.  He admits to snoring and hypersomnia.  Last echocardiogram was performed in 2018 showing normal EF with mild concentric left  "ventricular hypertrophy.  No change in social or family history.  No cardiac complaints at time of interview.    The following portions of the patient's history were reviewed and updated as appropriate: allergies, current medications, past family history, past medical history, past social history, past surgical history and problem list.      Review of Systems   Constitutional: Negative for chills and fever.   HENT:  Negative for hoarse voice and sore throat.    Eyes:  Negative for double vision and photophobia.   Cardiovascular:  Positive for chest pain. Negative for leg swelling, near-syncope, orthopnea, palpitations, paroxysmal nocturnal dyspnea and syncope.   Respiratory:  Negative for cough and wheezing.    Skin:  Negative for poor wound healing and rash.   Musculoskeletal:  Negative for arthritis and joint swelling.   Gastrointestinal:  Negative for bloating, abdominal pain, hematemesis and hematochezia.   Neurological:  Negative for dizziness and focal weakness.   Psychiatric/Behavioral:  Negative for depression and suicidal ideas.        OBJECTIVE:   Vital Signs  Vitals:    03/12/25 1445   BP: 126/96   Pulse: 66     Estimated body mass index is 40.61 kg/m² as calculated from the following:    Height as of this encounter: 177.8 cm (70\").    Weight as of this encounter: 128 kg (283 lb).    Vitals reviewed.   Constitutional:       Appearance: Healthy appearance. Not in distress.   Neck:      Vascular: No JVR. JVD normal.   Pulmonary:      Effort: Pulmonary effort is normal.      Breath sounds: Normal breath sounds. No wheezing. No rhonchi. No rales.   Chest:      Chest wall: Not tender to palpatation.   Cardiovascular:      PMI at left midclavicular line. Normal rate. Regular rhythm. Normal S1. Normal S2.       Murmurs: There is no murmur.      No gallop.  No click. No rub.   Pulses:     Intact distal pulses.   Edema:     Peripheral edema absent.   Abdominal:      General: Bowel sounds are normal.      " Palpations: Abdomen is soft.      Tenderness: There is no abdominal tenderness.   Musculoskeletal: Normal range of motion.         General: No tenderness. Skin:     General: Skin is warm and dry.   Neurological:      General: No focal deficit present.      Mental Status: Alert and oriented to person, place and time.         ECG 12 Lead    Date/Time: 3/12/2025 2:48 PM  Performed by: Isaac Hill Jr., MD    Authorized by: Isaac Hill Jr., MD  Comparison: compared with previous ECG from 5/18/2023  Rhythm: sinus rhythm  T inversion: V3, V4, V5 and V6    Clinical impression: abnormal EKG             BUN   Date Value Ref Range Status   05/18/2023 14 6 - 20 mg/dL Final     Creatinine   Date Value Ref Range Status   05/18/2023 1.00 0.76 - 1.27 mg/dL Final     Potassium   Date Value Ref Range Status   05/18/2023 4.1 3.5 - 5.2 mmol/L Final     ALT (SGPT)   Date Value Ref Range Status   09/15/2020 31 1 - 41 U/L Final   03/01/2018 31 1 - 41 U/L Final     AST (SGOT)   Date Value Ref Range Status   09/15/2020 23 1 - 40 U/L Final   03/01/2018 24 1 - 40 U/L Final           ASSESSMENT:     44-year-old gentleman presents for evaluation of persistently elevated blood pressure and precordial pain        PLAN OF CARE:     Essential hypertension -at this point I feel we have a very clear picture of persistently elevated blood pressure and I am going to start him on amlodipine 5 mg.  He has significant snoring and hypersomnia we will refer him to sleep medicine.  Twice daily blood pressure log to be sent in after 1 week.  Long conversation about need for sodium restricted diet and weight loss.  We are going to repeat an echocardiogram and follow clinical progress for further treatment recommendations.  Precordial pain -secondary to hypertension.  Patient had a negative stress study within the last 18 to 24 months.  Not related to physical activity.  No indication for ischemic workup at this time.  Optimize afterload  reduction.  Abnormal EKG -minimally changed from previous.  Likely secondary to hypertensive heart disease.  Hypersomnia -referral to sleep medicine clinic  Encounter for cardiovascular screening -I will see the patient back in 3 months.  We will request latest lipid profile from Dr. Guerrero.  Recommend weight loss and increased exercise.    Return to clinic 3 months         Discharge Medications            Accurate as of March 12, 2025  2:48 PM. If you have any questions, ask your nurse or doctor.                Continue These Medications        Instructions Start Date   Hydrocort-Pramoxine (Perianal) 2.5-1 % rectal cream  Commonly known as: Analpram HC   Apply to hemorrhoids twice daily for 10 days      multivitamin with minerals tablet tablet   1 tablet, Daily               Thank you for allowing me to participate in the care of your patient,      Sincerely,   Isaac Hill MD  [facility]  03/12/25  14:48 EDT

## 2025-04-02 ENCOUNTER — OFFICE VISIT (OUTPATIENT)
Facility: HOSPITAL | Age: 45
End: 2025-04-02
Payer: COMMERCIAL

## 2025-04-02 VITALS — HEIGHT: 70 IN | WEIGHT: 287 LBS | HEART RATE: 71 BPM | OXYGEN SATURATION: 97 % | BODY MASS INDEX: 41.09 KG/M2

## 2025-04-02 DIAGNOSIS — G47.10 HYPERSOMNIA WITH SLEEP APNEA: Primary | ICD-10-CM

## 2025-04-02 DIAGNOSIS — E66.01 CLASS 3 SEVERE OBESITY DUE TO EXCESS CALORIES WITH SERIOUS COMORBIDITY AND BODY MASS INDEX (BMI) OF 40.0 TO 44.9 IN ADULT: ICD-10-CM

## 2025-04-02 DIAGNOSIS — E66.813 CLASS 3 SEVERE OBESITY DUE TO EXCESS CALORIES WITH SERIOUS COMORBIDITY AND BODY MASS INDEX (BMI) OF 40.0 TO 44.9 IN ADULT: ICD-10-CM

## 2025-04-02 DIAGNOSIS — G47.30 HYPERSOMNIA WITH SLEEP APNEA: Primary | ICD-10-CM

## 2025-04-02 PROCEDURE — G0463 HOSPITAL OUTPT CLINIC VISIT: HCPCS
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
## 2025-04-03 PROBLEM — E66.01 CLASS 3 SEVERE OBESITY DUE TO EXCESS CALORIES WITH SERIOUS COMORBIDITY AND BODY MASS INDEX (BMI) OF 40.0 TO 44.9 IN ADULT: Status: ACTIVE | Noted: 2025-04-03

## 2025-04-03 PROBLEM — G47.30 HYPERSOMNIA WITH SLEEP APNEA: Status: ACTIVE | Noted: 2025-04-03

## 2025-04-03 PROBLEM — E66.813 CLASS 3 SEVERE OBESITY DUE TO EXCESS CALORIES WITH SERIOUS COMORBIDITY AND BODY MASS INDEX (BMI) OF 40.0 TO 44.9 IN ADULT: Status: ACTIVE | Noted: 2025-04-03

## 2025-04-03 PROBLEM — G47.10 HYPERSOMNIA WITH SLEEP APNEA: Status: ACTIVE | Noted: 2025-04-03

## 2025-04-16 ENCOUNTER — HOSPITAL ENCOUNTER (OUTPATIENT)
Dept: SLEEP MEDICINE | Facility: HOSPITAL | Age: 45
Discharge: HOME | End: 2025-04-16
Admitting: INTERNAL MEDICINE
Payer: COMMERCIAL

## 2025-04-16 DIAGNOSIS — G47.30 HYPERSOMNIA WITH SLEEP APNEA: ICD-10-CM

## 2025-04-16 DIAGNOSIS — G47.10 HYPERSOMNIA WITH SLEEP APNEA: ICD-10-CM

## 2025-04-16 PROCEDURE — G0399 HOME SLEEP TEST/TYPE 3 PORTA: HCPCS

## 2025-04-22 ENCOUNTER — TELEPHONE (OUTPATIENT)
Dept: SLEEP MEDICINE | Facility: HOSPITAL | Age: 45
End: 2025-04-22
Payer: COMMERCIAL

## 2025-04-24 RX ORDER — AMLODIPINE BESYLATE 5 MG/1
5 TABLET ORAL DAILY
Qty: 90 TABLET | Refills: 1 | Status: SHIPPED | OUTPATIENT
Start: 2025-04-24

## 2025-06-12 ENCOUNTER — OFFICE VISIT (OUTPATIENT)
Dept: CARDIOLOGY | Age: 45
End: 2025-06-12
Payer: COMMERCIAL

## 2025-06-12 VITALS
DIASTOLIC BLOOD PRESSURE: 88 MMHG | SYSTOLIC BLOOD PRESSURE: 128 MMHG | BODY MASS INDEX: 41.32 KG/M2 | WEIGHT: 288.6 LBS | HEIGHT: 70 IN | OXYGEN SATURATION: 99 %

## 2025-06-12 DIAGNOSIS — R07.2 PRECORDIAL PAIN: Primary | ICD-10-CM

## 2025-06-12 PROCEDURE — 99214 OFFICE O/P EST MOD 30 MIN: CPT | Performed by: INTERNAL MEDICINE

## 2025-06-13 RX ORDER — METOPROLOL TARTRATE 1 MG/ML
5 INJECTION, SOLUTION INTRAVENOUS
OUTPATIENT
Start: 2025-06-13

## 2025-06-13 RX ORDER — SODIUM CHLORIDE 9 MG/ML
40 INJECTION, SOLUTION INTRAVENOUS AS NEEDED
OUTPATIENT
Start: 2025-06-13

## 2025-06-13 RX ORDER — NITROGLYCERIN 0.4 MG/1
0.8 TABLET SUBLINGUAL
OUTPATIENT
Start: 2025-06-13

## 2025-06-13 RX ORDER — SODIUM CHLORIDE 0.9 % (FLUSH) 0.9 %
10 SYRINGE (ML) INJECTION EVERY 12 HOURS SCHEDULED
OUTPATIENT
Start: 2025-06-13

## 2025-06-13 RX ORDER — METOPROLOL TARTRATE 50 MG
TABLET ORAL
Qty: 2 TABLET | Refills: 0 | Status: SHIPPED | OUTPATIENT
Start: 2025-06-13

## 2025-06-13 RX ORDER — METOPROLOL TARTRATE 25 MG/1
150 TABLET, FILM COATED ORAL ONCE
OUTPATIENT
Start: 2025-06-13

## 2025-06-13 RX ORDER — METOPROLOL TARTRATE 25 MG/1
50 TABLET, FILM COATED ORAL ONCE
OUTPATIENT
Start: 2025-06-13

## 2025-06-13 RX ORDER — NITROGLYCERIN 0.4 MG/1
0.4 TABLET SUBLINGUAL
OUTPATIENT
Start: 2025-06-13 | End: 2025-06-13

## 2025-06-13 RX ORDER — METOPROLOL TARTRATE 25 MG/1
100 TABLET, FILM COATED ORAL ONCE
OUTPATIENT
Start: 2025-06-13

## 2025-06-13 RX ORDER — METOPROLOL TARTRATE 25 MG/1
200 TABLET, FILM COATED ORAL ONCE
OUTPATIENT
Start: 2025-06-13 | End: 2025-06-13

## 2025-06-13 RX ORDER — IVABRADINE 5 MG/1
15 TABLET, FILM COATED ORAL ONCE
OUTPATIENT
Start: 2025-06-13 | End: 2025-06-13

## 2025-06-13 RX ORDER — SODIUM CHLORIDE 0.9 % (FLUSH) 0.9 %
10 SYRINGE (ML) INJECTION AS NEEDED
OUTPATIENT
Start: 2025-06-13

## 2025-06-13 RX ORDER — METOPROLOL TARTRATE 50 MG
50 TABLET ORAL
OUTPATIENT
Start: 2025-06-13

## 2025-06-13 NOTE — PROGRESS NOTES
[facility]      Patient Name: Andre Eckert  :1980  Age: 44 y.o.  Encounter Provider:  Isaac Hill Jr, MD      Chief Complaint: Initial evaluation precordial pain      Hypertension  Associated symptoms include chest pain. Pertinent negatives include no orthopnea, palpitations or PND.       Andre Eckert is a 44 y.o. male with known history of abnormal EKG and normal echocardiogram in 2018 presents for follow-up evaluation of chest pain.     Last clinic visit note: Patient has been having a sharp precordial pain over the last week and a half.  He notes radiation to the back, neck and distal left arm at times.  No relationship to physical activity and he actually endorses improvement in pain when he gets on the treadmill.  No associated nausea, vomiting, diaphoresis.  No orthopnea, PND or edema.  No palpitations, dizziness or syncope.  He is a lifelong non-smoker who drinks 3-4 alcoholic beverages daily.  No family history of obstructive coronary artery disease.  Recent laboratories reviewed showing excellent cholesterol control.  Blood pressure is borderline elevated today in clinic.    After last visit the patient had a stress study that showed no evidence of ischemia.  He had no evidence of hypertension while in the clinic and was doing well until recently.  Patient has been having some episodes of chest pain at which time that his fiancée checked his blood pressure which was quite elevated.  On  was 159/102 mmHg.  She gave him one of her amlodipine tablets and this came down.  He has had a few episodes of chest pain over the past few weeks.  No clear relationship to physical activity.  Today in clinic blood pressure is 126/96 mmHg with a resting heart rate of 66 bpm.  Patient denies orthopnea, PND or edema.  No palpitations, dizziness syncope.  He admits to snoring and hypersomnia.  Last echocardiogram was performed in 2018 showing normal EF with mild concentric left  "ventricular hypertrophy.  No change in social or family history.  No cardiac complaints at time of interview.    Blood pressures are much better controlled but he continues to have epigastric versus lower anterior thoracic discomfort every so often.  Not a clear relationship to physical activity.  Echo was not done because he has a high deductible plan and is still paying off his stress study from 2 years ago which was $3000.  He denies orthopnea, PND or edema.  No palpitations, dizziness or syncope.  Resting blood pressure in clinic is 128/88 mmHg.    The following portions of the patient's history were reviewed and updated as appropriate: allergies, current medications, past family history, past medical history, past social history, past surgical history and problem list.      Review of Systems   Constitutional: Negative for chills and fever.   HENT:  Negative for hoarse voice and sore throat.    Eyes:  Negative for double vision and photophobia.   Cardiovascular:  Positive for chest pain. Negative for leg swelling, near-syncope, orthopnea, palpitations, paroxysmal nocturnal dyspnea and syncope.   Respiratory:  Negative for cough and wheezing.    Skin:  Negative for poor wound healing and rash.   Musculoskeletal:  Negative for arthritis and joint swelling.   Gastrointestinal:  Negative for bloating, abdominal pain, hematemesis and hematochezia.   Neurological:  Negative for dizziness and focal weakness.   Psychiatric/Behavioral:  Negative for depression and suicidal ideas.        OBJECTIVE:   Vital Signs  Vitals:    06/12/25 1421   BP: 128/88   SpO2: 99%     Estimated body mass index is 41.41 kg/m² as calculated from the following:    Height as of this encounter: 177.8 cm (70\").    Weight as of this encounter: 131 kg (288 lb 9.6 oz).    Vitals reviewed.   Constitutional:       Appearance: Healthy appearance. Not in distress.   Neck:      Vascular: No JVR. JVD normal.   Pulmonary:      Effort: Pulmonary effort is " normal.      Breath sounds: Normal breath sounds. No wheezing. No rhonchi. No rales.   Chest:      Chest wall: Not tender to palpatation.   Cardiovascular:      PMI at left midclavicular line. Normal rate. Regular rhythm. Normal S1. Normal S2.       Murmurs: There is no murmur.      No gallop.  No click. No rub.   Pulses:     Intact distal pulses.   Edema:     Peripheral edema absent.   Abdominal:      General: Bowel sounds are normal.      Palpations: Abdomen is soft.      Tenderness: There is no abdominal tenderness.   Musculoskeletal: Normal range of motion.         General: No tenderness. Skin:     General: Skin is warm and dry.   Neurological:      General: No focal deficit present.      Mental Status: Alert and oriented to person, place and time.         Procedures       BUN   Date Value Ref Range Status   05/18/2023 14 6 - 20 mg/dL Final     Creatinine   Date Value Ref Range Status   05/18/2023 1.00 0.76 - 1.27 mg/dL Final     Potassium   Date Value Ref Range Status   05/18/2023 4.1 3.5 - 5.2 mmol/L Final     ALT (SGPT)   Date Value Ref Range Status   09/15/2020 31 1 - 41 U/L Final   03/01/2018 31 1 - 41 U/L Final     AST (SGOT)   Date Value Ref Range Status   09/15/2020 23 1 - 40 U/L Final   03/01/2018 24 1 - 40 U/L Final           ASSESSMENT:     44-year-old gentleman presents for evaluation of persistently elevated blood pressure and precordial pain        PLAN OF CARE:     Essential hypertension -better controlled on current regimen.  Continue outpatient blood pressure log and monitoring.  Sodium restricted diet.  We will see if there are community options for echocardiogram at a financially feasible cost.  Precordial pain -continued issue with the negative stress study.  I am going to order a CT coronary angiogram and he will work on the financial details.  If he has recurrence of significant pain I would suggest an inpatient ischemic workup.  Abnormal EKG -minimally changed from previous.  Likely  secondary to hypertensive heart disease.  Hypersomnia -compliant with CPAP therapy now that he has a diagnosis of MARISSA  Encounter for cardiovascular screening -request latest lipid panel    Return to clinic 6 months         Discharge Medications            Accurate as of June 12, 2025 11:59 PM. If you have any questions, ask your nurse or doctor.                New Medications        Instructions Start Date   metoprolol tartrate 50 MG tablet  Commonly known as: LOPRESSOR  Started by: Isaac Hill   Take 50 mg at Bedtime the Night Before Coronary CTA Appointment and In the Morning 1 Hour Prior to Coronary CTA Appointment. Do not take if heart rate less than 60.             Continue These Medications        Instructions Start Date   amLODIPine 5 MG tablet  Commonly known as: NORVASC   5 mg, Oral, Daily      Hydrocort-Pramoxine (Perianal) 2.5-1 % rectal cream  Commonly known as: Analpram HC   Apply to hemorrhoids twice daily for 10 days      multivitamin with minerals tablet tablet   1 tablet, Daily               Thank you for allowing me to participate in the care of your patient,      Sincerely,   Isaac Hill MD  [facility]  06/13/25  09:11 EDT

## 2025-07-30 ENCOUNTER — OFFICE VISIT (OUTPATIENT)
Dept: SLEEP MEDICINE | Facility: HOSPITAL | Age: 45
End: 2025-07-30
Payer: COMMERCIAL

## 2025-07-30 VITALS — BODY MASS INDEX: 40.94 KG/M2 | WEIGHT: 286 LBS | HEART RATE: 90 BPM | HEIGHT: 70 IN | OXYGEN SATURATION: 97 %

## 2025-07-30 DIAGNOSIS — G47.36 HYPOXEMIA ASSOCIATED WITH SLEEP: ICD-10-CM

## 2025-07-30 DIAGNOSIS — G47.33 OSA ON CPAP: ICD-10-CM

## 2025-07-30 DIAGNOSIS — E66.813 CLASS 3 SEVERE OBESITY DUE TO EXCESS CALORIES WITH SERIOUS COMORBIDITY AND BODY MASS INDEX (BMI) OF 40.0 TO 44.9 IN ADULT: Primary | ICD-10-CM

## 2025-07-30 PROCEDURE — 99213 OFFICE O/P EST LOW 20 MIN: CPT | Performed by: INTERNAL MEDICINE

## 2025-07-30 PROCEDURE — G0463 HOSPITAL OUTPT CLINIC VISIT: HCPCS

## 2025-07-30 NOTE — PROGRESS NOTES
"[facility]  Follow Up Sleep Disorders Center Note     Chief Complaint:  MARISSA     Primary Care Physician: Susanne Guerrero MD    Interval History:   The patient is a 44 y.o. male who I last saw 4/2/2025 and that note was reviewed.  Due to complaints of hypersomnolence with snoring and witnessed apnea, home sleep study recommended.  Home sleep study 4/16/2025 demonstrated severe obstructive sleep apnea with AHI 89.3 events per hour.  Low oxygen saturation 68% and sleep-related hypoxia present for 112.8 minutes.  Auto CPAP initiated the patient is here today for follow-up.    He states he is improved.  He does work 2 jobs which interferes with his usage and compliance at times.  He gets home at night and crashes.  He does not sweat during sleep like he did before.  He states he does not have daytime sleepiness as before.  He goes to bed at 10:30 PM gets out of bed at 6:05 AM.  He will use the restroom during that time.    Review of Systems:    A complete review of systems was done and all were negative with the exception of the above    Social History:    Social History     Socioeconomic History    Marital status: Single   Tobacco Use    Smoking status: Never    Smokeless tobacco: Never   Vaping Use    Vaping status: Never Used   Substance and Sexual Activity    Alcohol use: Yes     Comment: Occasional; 8-10/week    Drug use: Yes     Types: Marijuana     Comment: 2-3 times per week     Sexual activity: Defer       Allergies:  Patient has no known allergies.     Medication Review:  Reviewed.      Vital Signs:    Vitals:    07/30/25 1304   Pulse: 90   SpO2: 97%   Weight: 130 kg (286 lb)   Height: 177.8 cm (70\")     Body mass index is 41.04 kg/m².    Physical Exam:    Constitutional:  Well developed 44 y.o. male that appears in no apparent distress.  Awake & oriented times 3.  Normal mood with normal recent and remote memory and normal judgement.  Eyes:  Conjunctivae normal.  Oropharynx: Previously, moist " mucous membranes without exudate and a large tongue and uvula and class II-3 Mallampati airway.      Self-administered [place] Sleepiness Scale test results: 4, previously 14  0-5 Lower normal daytime sleepiness  6-10 Higher normal daytime sleepiness  11-12 Mild, 13-15 Moderate, & 16-24 Severe excessive daytime sleepiness     Downloaded PAP Data Evaluated For Therapeutic Response and Compliance:  DME is [employer] and the patient uses a nasal cushion.  Downloads between 5/26 and 7/28/2025 compliance greater than 70%.  Average usage is 5 hours and 22 minutes.  Average AHI is mildly abnormal at 7.8 but his obstructive apnea index and central apnea index are both normal.  He does have a large leak.  Average auto CPAP pressure is 16.3 and his ResMed auto CPAP is 10-20    Impression:   Severe obstructive sleep apnea with sleep-related hypoxia by home sleep study 4/16/2025, weight 287 pounds, adequately treated with ResMed auto CPAP.  Downloads demonstrate the patient not to be at goal with compliance but demonstrates good usage.  The patient has improvement in complaints of hypersomnolence.    Plan:  Good sleep hygiene measures should be maintained.  Weight loss would be beneficial in this patient who has class III severe obesity by Body mass index is 41.04 kg/m².      Class 3 Severe Obesity (BMI >=40). Obesity-related health conditions include the following: obstructive sleep apnea and hypertension. Obesity is unchanged. BMI is is above average; BMI management plan is completed. We discussed portion control and increasing exercise.       After evaluating the patient and assessing results available, the patient is benefiting from the treatment being provided.     The patient will continue ResMed auto CPAP for their ongoing obstructive sleep apnea treatment.  Potential side effects of not using PAP therapy reviewed and addressed as needed.  After clinical evaluation and review of downloads, I recommend no changes to the  patient's pressures.  However, increased compliance encouraged.  A new prescription will be sent to the patient's DME as needed.    Due to significant sleep-related hypoxia identified, overnight oximetry on room air on auto CPAP will be reordered as initially requested and not yet done by the DME.    I answered all of the patient's questions.  The patient will call the Sleep Disorder Center for any problems and will follow up for obstructive sleep apnea care in 6 months.      Ar Gamez MD  Sleep Medicine  07/30/25  13:17 EDT

## 2025-08-02 PROBLEM — G47.30 HYPERSOMNIA WITH SLEEP APNEA: Status: RESOLVED | Noted: 2025-04-03 | Resolved: 2025-08-02

## 2025-08-02 PROBLEM — G47.33 OSA ON CPAP: Status: ACTIVE | Noted: 2025-04-16

## 2025-08-02 PROBLEM — G47.36 HYPOXEMIA ASSOCIATED WITH SLEEP: Status: ACTIVE | Noted: 2025-08-02

## 2025-08-02 PROBLEM — G47.10 HYPERSOMNIA WITH SLEEP APNEA: Status: RESOLVED | Noted: 2025-04-03 | Resolved: 2025-08-02

## 2025-08-07 DIAGNOSIS — R07.2 PRECORDIAL PAIN: ICD-10-CM

## 2025-08-07 RX ORDER — METOPROLOL TARTRATE 50 MG
TABLET ORAL
Qty: 2 TABLET | Refills: 0 | OUTPATIENT
Start: 2025-08-07